# Patient Record
Sex: MALE
[De-identification: names, ages, dates, MRNs, and addresses within clinical notes are randomized per-mention and may not be internally consistent; named-entity substitution may affect disease eponyms.]

---

## 2018-01-17 ENCOUNTER — HOSPITAL ENCOUNTER (OUTPATIENT)
Dept: HOSPITAL 42 - CATH | Age: 68
LOS: 1 days | Discharge: HOME | End: 2018-01-18
Attending: FAMILY MEDICINE
Payer: MEDICARE

## 2018-01-17 VITALS — BODY MASS INDEX: 39.7 KG/M2

## 2018-01-17 DIAGNOSIS — Z79.82: ICD-10-CM

## 2018-01-17 DIAGNOSIS — E78.00: ICD-10-CM

## 2018-01-17 DIAGNOSIS — E66.9: ICD-10-CM

## 2018-01-17 DIAGNOSIS — Z82.49: ICD-10-CM

## 2018-01-17 DIAGNOSIS — I10: ICD-10-CM

## 2018-01-17 DIAGNOSIS — I25.10: Primary | ICD-10-CM

## 2018-01-17 LAB
APTT BLD: 31 SECONDS (ref 25.1–36.5)
BASOPHILS # BLD AUTO: 0.03 K/MM3 (ref 0–2)
BASOPHILS NFR BLD: 0.4 % (ref 0–3)
BUN SERPL-MCNC: 14 MG/DL (ref 7–21)
CALCIUM SERPL-MCNC: 9 MG/DL (ref 8.4–10.5)
EOSINOPHIL # BLD: 0.3 10*3/UL (ref 0–0.7)
EOSINOPHIL NFR BLD: 4 % (ref 1.5–5)
ERYTHROCYTE [DISTWIDTH] IN BLOOD BY AUTOMATED COUNT: 13.5 % (ref 11.5–14.5)
GFR NON-AFRICAN AMERICAN: > 60
GRANULOCYTES # BLD: 4.4 10*3/UL (ref 1.4–6.5)
GRANULOCYTES NFR BLD: 63.3 % (ref 50–68)
HGB BLD-MCNC: 14.5 G/DL (ref 14–18)
INR PPP: 0.96 (ref 0.93–1.08)
LYMPHOCYTES # BLD: 1.4 10*3/UL (ref 1.2–3.4)
LYMPHOCYTES NFR BLD AUTO: 20.7 % (ref 22–35)
MCH RBC QN AUTO: 29.5 PG (ref 25–35)
MCHC RBC AUTO-ENTMCNC: 33.4 G/DL (ref 31–37)
MCV RBC AUTO: 88.4 FL (ref 80–105)
MONOCYTES # BLD AUTO: 0.8 10*3/UL (ref 0.1–0.6)
MONOCYTES NFR BLD: 11.6 % (ref 1–6)
PLATELET # BLD: 200 10^3/UL (ref 120–450)
PMV BLD AUTO: 9.7 FL (ref 7–11)
PROTHROMBIN TIME: 11 SECONDS (ref 9.4–12.5)
RBC # BLD AUTO: 4.91 10^6/UL (ref 3.5–6.1)
WBC # BLD AUTO: 7 10^3/UL (ref 4.5–11)

## 2018-01-17 PROCEDURE — 36415 COLL VENOUS BLD VENIPUNCTURE: CPT

## 2018-01-17 PROCEDURE — 85027 COMPLETE CBC AUTOMATED: CPT

## 2018-01-17 PROCEDURE — 85025 COMPLETE CBC W/AUTO DIFF WBC: CPT

## 2018-01-17 PROCEDURE — 93005 ELECTROCARDIOGRAM TRACING: CPT

## 2018-01-17 PROCEDURE — 80048 BASIC METABOLIC PNL TOTAL CA: CPT

## 2018-01-17 PROCEDURE — 99152 MOD SED SAME PHYS/QHP 5/>YRS: CPT

## 2018-01-17 PROCEDURE — 99153 MOD SED SAME PHYS/QHP EA: CPT

## 2018-01-17 PROCEDURE — 86850 RBC ANTIBODY SCREEN: CPT

## 2018-01-17 PROCEDURE — 93458 L HRT ARTERY/VENTRICLE ANGIO: CPT

## 2018-01-17 PROCEDURE — 85610 PROTHROMBIN TIME: CPT

## 2018-01-17 PROCEDURE — 86900 BLOOD TYPING SEROLOGIC ABO: CPT

## 2018-01-17 PROCEDURE — 85730 THROMBOPLASTIN TIME PARTIAL: CPT

## 2018-01-17 PROCEDURE — 80053 COMPREHEN METABOLIC PANEL: CPT

## 2018-01-17 PROCEDURE — 80061 LIPID PANEL: CPT

## 2018-01-17 NOTE — CARD
--------------- APPROVED REPORT --------------





EKG Measurement

Heart [id number]

[place] 186P34

[id number]

[id number]

[id number]



<Conclusion>

Marked sinus bradycardia

Borderline ECG

## 2018-01-17 NOTE — CARDCATH
PROCEDURE DATE:  01/17/2018



HISTORY:  The patient is a 67-year-old male with a history of

hypercholesterolemia and hypertension who presents with an abnormal stress

test.  Because of this, cardiac catheterization was recommended.



PROCEDURE:  Left heart catheterization with coronary arteriography, left

ventriculogram, supra-aortic valvular injection as well as PTCA and stent

of a diagonal vessel off of the LAD.



The right femoral artery was cannulated with 6-Amharic sheath.  There were

no complications.



I performed moderate sedation which included the presence of an independent

trained observer that assisted in monitoring the patient's level of

consciousness and physiologic status.  After administration of Versed and

fentanyl, my intra service time was 30 minutes.



The findings on catheterization revealed a left ventricle that was within

normal limits.  Estimated ejection fraction of 60%.



The RCA was a right dominant circulation and was visualized with

supra-aortic valvular injection.  There were no lesions noted in the

proximal, mid and distal portion of the RCA.



The left main artery was unremarkable.



The LAD revealed intimal irregularities throughout without critical

lesions.



The diagonal vessel which was a moderate size vessel revealed 80% stenoses

in its proximal portion.



The circumflex artery and obtuse marginal branches revealed intimal

irregularities without critical lesions.



The patient was started on intravenous Angiomax.



On the fluoroscopic guide, the guiding catheter was placed in the ostium of

the left main artery.  An 0.014 ATW wire was used to cross the critical

lesion in the diagonal vessel off the LAD.



A 2.25 x 8 mm drug-eluting stent was placed and deployed at 14 atmospheres

of pressure.  Repeat coronary arteriography revealed an excellent result

with no residual stenosis and ANTONIO III flow.



Patient tolerated the procedure well.



In summary, the procedure was successful with PTCA and stent of an 80%

stenosis in the proximal diagonal vessel of the LAD.



Cardiac catheterization revealed single-vessel CAD.



Normal LV function.



Given these findings, the patient will need to will need to remain on

aspirin indefinitely and Plavix for at least a year and undergo a strict

cardiac risk reduction program which needs to include aggressive weight

loss.







__________________________________________

Obed Paredes MD





DD:  01/17/2018 11:26:37

DT:  01/17/2018 11:31:31

Job # [id number]

## 2018-01-18 VITALS — TEMPERATURE: 97.6 F | RESPIRATION RATE: 20 BRPM

## 2018-01-18 VITALS — HEART RATE: 56 BPM

## 2018-01-18 VITALS — DIASTOLIC BLOOD PRESSURE: 78 MMHG | OXYGEN SATURATION: 99 % | SYSTOLIC BLOOD PRESSURE: 163 MMHG

## 2018-01-18 LAB
ALBUMIN SERPL-MCNC: 4.3 G/DL (ref 3–4.8)
ALBUMIN/GLOB SERPL: 1.2 {RATIO} (ref 1.1–1.8)
ALT SERPL-CCNC: 36 U/L (ref 7–56)
AST SERPL-CCNC: 31 U/L (ref 17–59)
BASOPHILS # BLD AUTO: 0.02 K/MM3 (ref 0–2)
BASOPHILS NFR BLD: 0.3 % (ref 0–3)
BUN SERPL-MCNC: 12 MG/DL (ref 7–21)
CALCIUM SERPL-MCNC: 9.4 MG/DL (ref 8.4–10.5)
EOSINOPHIL # BLD: 0.2 10*3/UL (ref 0–0.7)
EOSINOPHIL NFR BLD: 2.8 % (ref 1.5–5)
ERYTHROCYTE [DISTWIDTH] IN BLOOD BY AUTOMATED COUNT: 13.7 % (ref 11.5–14.5)
GFR NON-AFRICAN AMERICAN: > 60
GRANULOCYTES # BLD: 4.93 10*3/UL (ref 1.4–6.5)
GRANULOCYTES NFR BLD: 63.5 % (ref 50–68)
HDLC SERPL-MCNC: 57 MG/DL (ref 29–60)
HGB BLD-MCNC: 16.2 G/DL (ref 14–18)
LDLC SERPL-MCNC: 138 MG/DL (ref 0–129)
LYMPHOCYTES # BLD: 1.8 10*3/UL (ref 1.2–3.4)
LYMPHOCYTES NFR BLD AUTO: 23.1 % (ref 22–35)
MCH RBC QN AUTO: 29.7 PG (ref 25–35)
MCHC RBC AUTO-ENTMCNC: 33.1 G/DL (ref 31–37)
MCV RBC AUTO: 89.9 FL (ref 80–105)
MONOCYTES # BLD AUTO: 0.8 10*3/UL (ref 0.1–0.6)
MONOCYTES NFR BLD: 10.3 % (ref 1–6)
PLATELET # BLD: 212 10^3/UL (ref 120–450)
PMV BLD AUTO: 9.7 FL (ref 7–11)
RBC # BLD AUTO: 5.45 10^6/UL (ref 3.5–6.1)
WBC # BLD AUTO: 7.8 10^3/UL (ref 4.5–11)

## 2018-01-18 NOTE — CARD
--------------- APPROVED REPORT --------------





EKG Measurement

Heart [id number]

[place] [id number]

[id number]

[id number]

[id number]



<Conclusion>

Marked sinus bradycardia

Incomplete right bundle branch block

Leftward axis

Nonspecific ST and T wave abnormality, new

## 2018-01-18 NOTE — HP
HISTORY OF PRESENT ILLNESS:  I was called by Dr. Obed Paredes to come and

admit him to my service, status post cardiac cath and stent placement this

morning at [facility].  He is a 67-year-old white male who is

lying flat in the hospital bed, status post cardiac stent for coronary

artery disease.  He had a left heart cath with stent placement.  He has a

history of hypertension, high cholesterol.  He is obese.  He has no history

of blood transfusions.  He is not drinking much and/or smoking.  He has a

family history of coronary artery disease.  He has had surgeries before. 

He has no acute vision or hearing changes.  He is only short of breath at

times with exertion.  No nausea or vomiting.  No abdominal pain.  No

extremity pain.



PHYSICAL EXAMINATION:

VITAL SIGNS:  He has a 97.8 temperature, 50 pulse, 139/60 blood pressure,

20 respiratory rate, and 99% O2 sat on room air.

HEAD:  Atraumatic, normocephalic.  Extraocular muscles are intact.  Throat

is moist.

NECK:  Supple.

HEART:   Regular rate.

LUNGS:  Decreased breath sounds but clear to auscultation.

ABDOMEN:  Soft, obese, nontender with positive bowel sounds.

EXTREMITIES:  No edema.  He is lying flat at this time.

NEUROLOGIC:  Cranial nerves II through XII grossly intact.



MEDICATIONS:  He is currently on Ecotrin, Lipitor, Plavix, and IV fluids.



LABORATORY DATA:  He has 138 sodium, potassium 3.9, BUN 14, creatinine 1,

GFR is greater than 60, sugar is 90, calcium is 9.  INR is 0.96.  He has 7

white count, 14.5 hemoglobin, 43.4 hematocrit with 200 platelets.  We will

check his labs tomorrow.  He understands to lie back in the bed for 6 hours

status post procedure.  His diet ordered.  Hopefully, tomorrow when I see

him, will be able to discharge him home.





__________________________________________

Maximilian Infante DO





DD:  01/17/2018 14:24:07

DT:  01/17/2018 14:26:56

Job # [id number]

## 2018-01-18 NOTE — DS
HOSPITAL COURSE:  He had a cardiac cath yesterday with Dr. Paredes and stent

placement.  He slept well.  He did very well.  He is eating well.  He is

walking well.  No complaints.  No breathing.  No problems.  He will go home

on his aspirin 81 mg, Lipitor 40, and Plavix 75 mg.



PHYSICAL EXAMINATION:

VITAL SIGNS:  He has 97.6 temperature, pulse, 163/78 blood pressure,

20 respiratory rate, 99% O2 sat on room air.

HEENT:  Head is atraumatic and normocephalic.

HEART:  Regular rate.

LUNGS:  Clear to auscultation.

ABDOMEN:  Soft 

EXTREMITIES:  No edema.



LABORATORY DATA:  He did well.  He had blood test.  Sodium 137, potassium

4.2, BUN 12, creatinine 1, GFR is greater than 60, sugar is 95, calcium is

9.4, total bilirubin is 0.8, AST is 31, ALT is 36, and alkaline phosphatase

is 88.  Total protein is 7.9 and total albumin is 4.3.  White count 7.8,

hemoglobin 16.2, hematocrit 49, and platelets 212.



He will follow up with his primary care physician and Dr. Paredes in the

outpatient.  He will be able to be discharged after Dr. Paredes sees him.  He

is here for CAD, cardiac cath, and stent placement.





__________________________________________

Maximilian Infante DO





DD:  01/18/2018 8:47:34

DT:  01/18/2018 9:36:36

Job # [id number]



[facility]

## 2018-01-18 NOTE — PN
DATE:  01/18/2018



CARDIOLOGY FOLLOWUP



SUBJECTIVE:  The patient is asymptomatic.



PHYSICAL EXAMINATION:

VITAL SIGNS:  Blood pressure 147/76 and heart rate is in the 60s.

NECK:  Negative JVD.

LUNGS:  Without rales.

HEART:  Reveals S1 and S2.

EXTREMITIES:  Without edema.  The right groin site is stable.



LABORATORY DATA:  Hemoglobin is 16.2.  Chemistries, BUN and creatinine

unremarkable.



IMPRESSION

1.  Stable post percutaneous transluminal coronary angioplasty and stent

with a drug-eluting stent in the diagonal vessel.

2.  Hypercholesterolemia.

3.  Hypertension.

4.  Coronary artery disease.



PLAN:  Given these findings, the patient is stable for discharge.  Followup

instructions have been given to the patient in detail.  He will need to

remain on aspirin indefinitely and Plavix for at least a year and undergo a

strict cardiac risk reduction program.  Followup instructions have been

given to the patient in detail.





__________________________________________

Obed Paredes MD





DD:  01/18/2018 9:48:38

DT:  01/18/2018 9:51:12

Job # [id number]